# Patient Record
Sex: FEMALE | Race: WHITE
[De-identification: names, ages, dates, MRNs, and addresses within clinical notes are randomized per-mention and may not be internally consistent; named-entity substitution may affect disease eponyms.]

---

## 2018-02-28 ENCOUNTER — HOSPITAL ENCOUNTER (OUTPATIENT)
Dept: HOSPITAL 80 - FIMAGING | Age: 38
End: 2018-02-28
Attending: PHYSICIAN ASSISTANT
Payer: COMMERCIAL

## 2018-02-28 DIAGNOSIS — R20.2: ICD-10-CM

## 2018-02-28 DIAGNOSIS — R51: ICD-10-CM

## 2018-02-28 DIAGNOSIS — R20.0: Primary | ICD-10-CM

## 2018-03-17 ENCOUNTER — HOSPITAL ENCOUNTER (EMERGENCY)
Dept: HOSPITAL 80 - FED | Age: 38
Discharge: HOME | End: 2018-03-17
Payer: COMMERCIAL

## 2018-03-17 VITALS
HEART RATE: 72 BPM | RESPIRATION RATE: 16 BRPM | DIASTOLIC BLOOD PRESSURE: 75 MMHG | OXYGEN SATURATION: 97 % | SYSTOLIC BLOOD PRESSURE: 114 MMHG

## 2018-03-17 VITALS — TEMPERATURE: 97.7 F

## 2018-03-17 DIAGNOSIS — F17.200: ICD-10-CM

## 2018-03-17 DIAGNOSIS — K59.00: Primary | ICD-10-CM

## 2018-03-17 NOTE — EDPHY
H & P


Smoking Status: Current every day smoker


Time Seen by Provider: 03/17/18 11:08


HPI/ROS: 





CHIEF COMPLAINT:  Constipation





HISTORY OF PRESENT ILLNESS:  29-year-old female presents to the emergency 

department with a history of chronic constipation.  She also has a history of 

chronic bowel issues.  She has gluten intolerant.  She was recently started on 

amitriptyline which she thinks flared up her got.  She denies any diarrhea.  

Denies abdominal pain.  Denies chest pain or difficulty breathing.  Denies 

fevers or chills.  She tried multiple laxatives yesterday without relief.  She 

is able to pass gas.





REVIEW OF SYSTEMS:


Constitutional:  No fever, no chills.


Eyes:  No double or blurry vision.


ENT:  No sore throat.


Respiratory:  No cough, no shortness of breath.


Cardiac:  No chest pain.


Gastrointestinal:  Mild abdominal pain.  Constipation.  No vomiting or diarrhea.


Genitourinary:  No dysuria.


Musculoskeletal:  No neck or back pain.


Skin:  No rashes.


Neurological:  No headache. (SimonaNereyda EFE)


Past Medical/Surgical History: 





Chronic GI issues, gluten intolerance (Katie Jarvisa EFE)


Social History: 





Single (Nereyda Jarvis)


Physical Exam: 





General Appearance:  Alert, no distress.


Eyes:  Pupils equal and round.  Extraocular motions are all intact.


ENT:  Mouth:  Mucous membranes moist.


Respiratory:  No wheezing, rhonchi, or rales, lungs are clear to auscultation.


Cardiovascular:  Regular rate and rhythm.


Gastrointestinal:  Abdomen is soft and nontender, no masses, no rebound or 

guarding, bowel sounds normal.  No CVA tenderness bilaterally.


Neurological:  Alert and oriented x 3, cranial nerves II through XII grossly 

intact


Skin:  Warm and dry, no rashes.


Musculoskeletal:  Nontender to palpate along the cervical, thoracic or lumbar 

spine.  Neck is supple.


Extremities:  Full range of motion and no peripheral edema.


Psychiatric:  Patient is oriented X 3, there is no agitation. (Belinda Jarvisrina EFE)


Constitutional: 





 Initial Vital Signs











Temperature (C)  36.5 C   03/17/18 10:01


 


Heart Rate  95   03/17/18 10:01


 


Respiratory Rate  18   03/17/18 10:01


 


Blood Pressure  153/86 H  03/17/18 10:01


 


O2 Sat (%)  96   03/17/18 10:01








 











O2 Delivery Mode               Room Air














Allergies/Adverse Reactions: 


 





No Known Allergies Allergy (Unverified 03/17/18 10:00)


 








Home Medications: 














 Medication  Instructions  Recorded


 


Estradiol  03/17/18


 


Laxative  03/17/18














Medical Decision Making


ED Course/Re-evaluation: 





37-year-old female presents to the emergency department with constipation.  

Patient had an x-ray done as an outpatient 1 week ago and she declined another x

-ray.  I did do a rectal exam and there was no stool noted.  She did have a 

Fleet's enema and was not able to have a bowel movement.  She has no abdominal 

pain.  She feels comfortable being discharged home.  She was requesting 

something different for her constipation.  I did give her some GoLYTELY to go 

home with and she may use half a prep.  I also encouraged to have close follow-

up with GI the [facility].


 (Nereyda Jarvis)





I did not see this patient while she was in the emergency department.  However 

her care was discussed with the PA while the patient was in the department.  I 

agree with treatment plan and management (Arnold Musa)


Differential Diagnosis: 





Including but not limited to constipation, bowel obstruction, colitis (Nereyda Jarvis)





Departure





- Departure


Disposition: Home, Routine, Self-Care


Clinical Impression: 


 Constipation





Condition: Good


Instructions:  Constipation (ED)


Additional Instructions: 


You may try magnesium citrate as directed.  Drink plenty of fluids.  You should 

keep scheduled follow-up appointment with Gastroenterology.  Return to the 

emergency department sooner if he developed blood in her stool, fever, or if 

you feel worse in any way.


Referrals: 


RADHA LINDQUIST [Primary Care Provider] - As per Instructions


Jonel Lynne MD [Medical Doctor] - As per Instructions (Gastroenterologist on-

call)

## 2019-02-13 ENCOUNTER — HOSPITAL ENCOUNTER (OUTPATIENT)
Dept: HOSPITAL 80 - FCPNEURO | Age: 39
End: 2019-02-13
Attending: PSYCHIATRY & NEUROLOGY
Payer: COMMERCIAL

## 2019-02-13 DIAGNOSIS — R41.89: Primary | ICD-10-CM

## 2019-02-13 DIAGNOSIS — R51: ICD-10-CM

## 2019-02-14 NOTE — CPEEG
[f rep st]



                                                      ELECTROENCEPHALOGRAM





EEG



DATE OF STUDY:  02/13/2019



DATE OF INTERPRETATION:  February 14, 2019





DATE OF STUDY:  February 13, 2019



INTERPRETATION:  Normal EEG during wakefulness and sleep.  There were no potentially epileptogenic ab
normalities present in the recording.



REPORT:  This EEG contains 10 Hz alpha activity to the posterior head regions.  The background activi
ty was normal and symmetric.  There was no abnormal activation at rest, photic stimulation or hyperve
ntilation.  The patient became drowsy and fell asleep during the study.  There was no abnormal activa
tion during drowsiness, sleep or during times of arousal.





Job #:  [id number]